# Patient Record
Sex: FEMALE | Race: WHITE | NOT HISPANIC OR LATINO | Employment: OTHER | URBAN - METROPOLITAN AREA
[De-identification: names, ages, dates, MRNs, and addresses within clinical notes are randomized per-mention and may not be internally consistent; named-entity substitution may affect disease eponyms.]

---

## 2017-03-02 DIAGNOSIS — I10 ESSENTIAL HYPERTENSION: ICD-10-CM

## 2017-03-03 RX ORDER — METOPROLOL SUCCINATE 50 MG/1
TABLET, EXTENDED RELEASE ORAL
Qty: 90 TABLET | Refills: 0 | OUTPATIENT
Start: 2017-03-03

## 2017-03-06 ENCOUNTER — OFFICE VISIT (OUTPATIENT)
Dept: FAMILY MEDICINE CLINIC | Facility: CLINIC | Age: 77
End: 2017-03-06

## 2017-03-06 VITALS
BODY MASS INDEX: 31.72 KG/M2 | OXYGEN SATURATION: 95 % | SYSTOLIC BLOOD PRESSURE: 122 MMHG | DIASTOLIC BLOOD PRESSURE: 72 MMHG | WEIGHT: 168 LBS | RESPIRATION RATE: 16 BRPM | TEMPERATURE: 98.4 F | HEIGHT: 61 IN | HEART RATE: 73 BPM

## 2017-03-06 DIAGNOSIS — I10 ESSENTIAL HYPERTENSION: ICD-10-CM

## 2017-03-06 DIAGNOSIS — E78.5 DYSLIPIDEMIA: ICD-10-CM

## 2017-03-06 DIAGNOSIS — J01.00 ACUTE NON-RECURRENT MAXILLARY SINUSITIS: ICD-10-CM

## 2017-03-06 PROCEDURE — 99214 OFFICE O/P EST MOD 30 MIN: CPT | Performed by: NURSE PRACTITIONER

## 2017-03-06 RX ORDER — TRIAMTERENE AND HYDROCHLOROTHIAZIDE 37.5; 25 MG/1; MG/1
1 TABLET ORAL DAILY
Qty: 90 TABLET | Refills: 1 | Status: SHIPPED | OUTPATIENT
Start: 2017-03-06

## 2017-03-06 RX ORDER — METOPROLOL SUCCINATE 50 MG/1
50 TABLET, EXTENDED RELEASE ORAL DAILY
Qty: 90 TABLET | Refills: 1 | Status: SHIPPED | OUTPATIENT
Start: 2017-03-06 | End: 2017-10-10 | Stop reason: SDUPTHER

## 2017-03-06 RX ORDER — ATORVASTATIN CALCIUM 40 MG/1
40 TABLET, FILM COATED ORAL NIGHTLY
Qty: 90 TABLET | Refills: 1 | Status: SHIPPED | OUTPATIENT
Start: 2017-03-06

## 2017-03-06 RX ORDER — FEXOFENADINE HCL 180 MG/1
180 TABLET ORAL DAILY
Qty: 90 TABLET | Refills: 1 | Status: SHIPPED | OUTPATIENT
Start: 2017-03-06 | End: 2017-12-19 | Stop reason: SDUPTHER

## 2017-03-06 RX ORDER — POTASSIUM CHLORIDE 750 MG/1
10 TABLET, FILM COATED, EXTENDED RELEASE ORAL DAILY
Qty: 90 TABLET | Refills: 1 | Status: SHIPPED | OUTPATIENT
Start: 2017-03-06

## 2017-03-06 NOTE — PROGRESS NOTES
Subjective   Steph Quiles is a 76 y.o. female.     History of Present Illness   Steph Quiles 76 y.o. female who presents today for routine follow up check and medication refills.  she has a history of   Patient Active Problem List   Diagnosis   • Gastroesophageal reflux disease   • Anxiety   • Allergic rhinitis   • Asymptomatic postmenopausal status   • Cervical radiculopathy   • Cervicalgia   • Cold intolerance   • Herpes labialis   • Diarrhea   • Dyslipidemia   • Dysuria   • Hirsutism   • Hypertension   • Strain of lumbar region   • Abnormal mammogram   • Sciatica   • Shoulder joint derangement   • Vitamin D deficiency   • Moderate persistent asthma without complication   • Degenerative disc disease, lumbar   • Spinal stenosis, lumbar   • Multilevel degenerative disc disease   .  Since the last visit, she has overall felt well.  she has been compliant with current meds.  she denies medication side effects. Patient sees pulmonologist Dr. Andino for asthma management.   The following portions of the patient's history were reviewed and updated as appropriate: allergies, current medications, past family history, past medical history, past social history, past surgical history and problem list.    Review of Systems   Constitutional: Negative for unexpected weight change.   Respiratory: Negative for shortness of breath.    Cardiovascular: Negative for chest pain and palpitations.   Psychiatric/Behavioral: Negative for behavioral problems.       Objective   Physical Exam   Constitutional: She is oriented to person, place, and time. She appears well-developed and well-nourished.   Neck: Carotid bruit is not present.   Cardiovascular: Normal rate and regular rhythm.    Pulmonary/Chest: Effort normal and breath sounds normal.   Neurological: She is alert and oriented to person, place, and time.   Psychiatric: She has a normal mood and affect. Judgment normal.   Vitals reviewed.      Assessment/Plan   Steph was seen today  for allergies and hypertension.    Diagnoses and all orders for this visit:    Essential hypertension  -     triamterene-hydrochlorothiazide (MAXZIDE-25) 37.5-25 MG per tablet; Take 1 tablet by mouth Daily.  -     potassium chloride (K-DUR) 10 MEQ CR tablet; Take 1 tablet by mouth Daily.  -     metoprolol succinate XL (TOPROL-XL) 50 MG 24 hr tablet; Take 1 tablet by mouth Daily.  -     Comprehensive metabolic panel  -     Lipid panel  -     CBC and Differential  -     TSH    Acute non-recurrent maxillary sinusitis  -     sertraline (ZOLOFT) 50 MG tablet; Take 1 tablet by mouth Daily.    Dyslipidemia  -     atorvastatin (LIPITOR) 40 MG tablet; Take 1 tablet by mouth Every Night.  -     Comprehensive metabolic panel  -     Lipid panel  -     CBC and Differential  -     TSH    Other orders  -     fexofenadine (ALLEGRA) 180 MG tablet; Take 1 tablet by mouth Daily.

## 2017-03-08 LAB
ALBUMIN SERPL-MCNC: 4.3 G/DL (ref 3.5–5.2)
ALBUMIN/GLOB SERPL: 1.7 G/DL
ALP SERPL-CCNC: 64 U/L (ref 39–117)
ALT SERPL-CCNC: 14 U/L (ref 1–33)
AST SERPL-CCNC: 16 U/L (ref 1–32)
BASOPHILS # BLD AUTO: 0.04 10*3/MM3 (ref 0–0.2)
BASOPHILS NFR BLD AUTO: 0.7 % (ref 0–1.5)
BILIRUB SERPL-MCNC: 0.5 MG/DL (ref 0.1–1.2)
BUN SERPL-MCNC: 16 MG/DL (ref 8–23)
BUN/CREAT SERPL: 16.7 (ref 7–25)
CALCIUM SERPL-MCNC: 10.2 MG/DL (ref 8.6–10.5)
CHLORIDE SERPL-SCNC: 103 MMOL/L (ref 98–107)
CHOLEST SERPL-MCNC: 224 MG/DL (ref 0–200)
CO2 SERPL-SCNC: 27.3 MMOL/L (ref 22–29)
CREAT SERPL-MCNC: 0.96 MG/DL (ref 0.57–1)
EOSINOPHIL # BLD AUTO: 0.38 10*3/MM3 (ref 0–0.7)
EOSINOPHIL NFR BLD AUTO: 6.7 % (ref 0.3–6.2)
ERYTHROCYTE [DISTWIDTH] IN BLOOD BY AUTOMATED COUNT: 13.7 % (ref 11.7–13)
GLOBULIN SER CALC-MCNC: 2.6 GM/DL
GLUCOSE SERPL-MCNC: 107 MG/DL (ref 65–99)
HCT VFR BLD AUTO: 44.2 % (ref 35.6–45.5)
HDLC SERPL-MCNC: 95 MG/DL (ref 40–60)
HGB BLD-MCNC: 13.9 G/DL (ref 11.9–15.5)
IMM GRANULOCYTES # BLD: 0 10*3/MM3 (ref 0–0.03)
IMM GRANULOCYTES NFR BLD: 0 % (ref 0–0.5)
LDLC SERPL CALC-MCNC: 112 MG/DL (ref 0–100)
LYMPHOCYTES # BLD AUTO: 2.05 10*3/MM3 (ref 0.9–4.8)
LYMPHOCYTES NFR BLD AUTO: 36.3 % (ref 19.6–45.3)
MCH RBC QN AUTO: 28.4 PG (ref 26.9–32)
MCHC RBC AUTO-ENTMCNC: 31.4 G/DL (ref 32.4–36.3)
MCV RBC AUTO: 90.2 FL (ref 80.5–98.2)
MONOCYTES # BLD AUTO: 0.4 10*3/MM3 (ref 0.2–1.2)
MONOCYTES NFR BLD AUTO: 7.1 % (ref 5–12)
NEUTROPHILS # BLD AUTO: 2.78 10*3/MM3 (ref 1.9–8.1)
NEUTROPHILS NFR BLD AUTO: 49.2 % (ref 42.7–76)
PLATELET # BLD AUTO: 200 10*3/MM3 (ref 140–500)
POTASSIUM SERPL-SCNC: 5.7 MMOL/L (ref 3.5–5.2)
PROT SERPL-MCNC: 6.9 G/DL (ref 6–8.5)
RBC # BLD AUTO: 4.9 10*6/MM3 (ref 3.9–5.2)
SODIUM SERPL-SCNC: 145 MMOL/L (ref 136–145)
TRIGL SERPL-MCNC: 85 MG/DL (ref 0–150)
TSH SERPL DL<=0.005 MIU/L-ACNC: 2.97 MIU/ML (ref 0.27–4.2)
VLDLC SERPL CALC-MCNC: 17 MG/DL (ref 5–40)
WBC # BLD AUTO: 5.65 10*3/MM3 (ref 4.5–10.7)

## 2017-03-09 ENCOUNTER — TELEPHONE (OUTPATIENT)
Dept: FAMILY MEDICINE CLINIC | Facility: CLINIC | Age: 77
End: 2017-03-09

## 2017-03-09 DIAGNOSIS — E87.5 SERUM POTASSIUM ELEVATED: Primary | ICD-10-CM

## 2017-03-09 NOTE — TELEPHONE ENCOUNTER
----- Message from GUNNAR Dash sent at 3/8/2017  9:45 PM EST -----  Potassium is elevated.  This can cause issues with heart rhythm.  Have her stop oral potassium and repeat nonfasting BMP in 1 week

## 2017-03-18 LAB
BUN SERPL-MCNC: 14 MG/DL (ref 8–23)
BUN/CREAT SERPL: 16.9 (ref 7–25)
CALCIUM SERPL-MCNC: 9.6 MG/DL (ref 8.6–10.5)
CHLORIDE SERPL-SCNC: 103 MMOL/L (ref 98–107)
CO2 SERPL-SCNC: 28.9 MMOL/L (ref 22–29)
CREAT SERPL-MCNC: 0.83 MG/DL (ref 0.57–1)
GLUCOSE SERPL-MCNC: 111 MG/DL (ref 65–99)
POTASSIUM SERPL-SCNC: 4.5 MMOL/L (ref 3.5–5.2)
SODIUM SERPL-SCNC: 145 MMOL/L (ref 136–145)

## 2017-03-22 ENCOUNTER — TELEPHONE (OUTPATIENT)
Dept: FAMILY MEDICINE CLINIC | Facility: CLINIC | Age: 77
End: 2017-03-22

## 2017-10-10 DIAGNOSIS — I10 ESSENTIAL HYPERTENSION: ICD-10-CM

## 2017-10-10 RX ORDER — METOPROLOL SUCCINATE 50 MG/1
50 TABLET, EXTENDED RELEASE ORAL DAILY
Qty: 30 TABLET | Refills: 0 | Status: SHIPPED | OUTPATIENT
Start: 2017-10-10 | End: 2018-07-09 | Stop reason: SDUPTHER

## 2017-10-18 ENCOUNTER — APPOINTMENT (OUTPATIENT)
Dept: GENERAL RADIOLOGY | Facility: HOSPITAL | Age: 77
End: 2017-10-18

## 2017-10-18 ENCOUNTER — HOSPITAL ENCOUNTER (EMERGENCY)
Facility: HOSPITAL | Age: 77
Discharge: HOME | End: 2017-10-18
Attending: EMERGENCY MEDICINE | Admitting: EMERGENCY MEDICINE

## 2017-10-18 ENCOUNTER — DOCUMENTATION (OUTPATIENT)
Dept: SOCIAL WORK | Facility: HOSPITAL | Age: 77
End: 2017-10-18

## 2017-10-18 VITALS
RESPIRATION RATE: 16 BRPM | HEIGHT: 61 IN | OXYGEN SATURATION: 96 % | SYSTOLIC BLOOD PRESSURE: 120 MMHG | BODY MASS INDEX: 30.02 KG/M2 | WEIGHT: 159 LBS | HEART RATE: 71 BPM | TEMPERATURE: 98 F | DIASTOLIC BLOOD PRESSURE: 66 MMHG

## 2017-10-18 DIAGNOSIS — S83.92XA SPRAIN OF LEFT KNEE, UNSPECIFIED LIGAMENT, INITIAL ENCOUNTER: Primary | ICD-10-CM

## 2017-10-18 PROCEDURE — 73560 X-RAY EXAM OF KNEE 1 OR 2: CPT

## 2017-10-18 PROCEDURE — 25010000002 ONDANSETRON PER 1 MG: Performed by: EMERGENCY MEDICINE

## 2017-10-18 PROCEDURE — 99283 EMERGENCY DEPT VISIT LOW MDM: CPT

## 2017-10-18 PROCEDURE — 96372 THER/PROPH/DIAG INJ SC/IM: CPT

## 2017-10-18 PROCEDURE — 25010000002 MORPHINE PER 10 MG: Performed by: EMERGENCY MEDICINE

## 2017-10-18 RX ORDER — HYDROCODONE BITARTRATE AND ACETAMINOPHEN 5; 325 MG/1; MG/1
1 TABLET ORAL EVERY 6 HOURS PRN
Qty: 10 TABLET | Refills: 0 | Status: SHIPPED | OUTPATIENT
Start: 2017-10-18

## 2017-10-18 RX ORDER — ONDANSETRON 2 MG/ML
4 INJECTION INTRAMUSCULAR; INTRAVENOUS ONCE
Status: COMPLETED | OUTPATIENT
Start: 2017-10-18 | End: 2017-10-18

## 2017-10-18 RX ORDER — DOCUSATE SODIUM 100 MG/1
100 CAPSULE, LIQUID FILLED ORAL 2 TIMES DAILY PRN
Qty: 10 CAPSULE | Refills: 0 | Status: SHIPPED | OUTPATIENT
Start: 2017-10-18

## 2017-10-18 RX ADMIN — ONDANSETRON 4 MG: 2 INJECTION INTRAMUSCULAR; INTRAVENOUS at 13:43

## 2017-10-18 RX ADMIN — MORPHINE SULFATE 4 MG: 4 INJECTION, SOLUTION INTRAMUSCULAR; INTRAVENOUS at 13:43

## 2017-10-18 NOTE — PROGRESS NOTES
CCP spoke w/patient regarding request for knee scooter due to knee injury. Spoke w/mar who advised cost is $75/month rental fee; No RX needed. Patient and ERMD advised. Soledad Owen RN

## 2017-10-18 NOTE — ED PROVIDER NOTES
" EMERGENCY DEPARTMENT ENCOUNTER    CHIEF COMPLAINT  Chief Complaint: Knee Pain  History given by: Patient, Family  History limited by: N/A  Room Number: 23/23  PMD: GUNNAR Dash      HPI:  Pt reports that she is s/p left leg surgery performed in the past. Pt states that she was on her feet for a prolonged period of time yesterday while doing yard work and her daily house chores. Pt reports that while she was mopping the floor last night, pt developed pain of the left knee. No known injuries sustained to the left knee recently. Pt states that her left knee pain worsens with movement of the left knee and improves with resting the left knee. Pt denies documented fever, dyspnea, chest pain, left calf pain/tenderness, and focal weakness/numbness of the LLE. There are no other complaints at this time.     Pain Location: Left knee  Radiation: None  Quality: \"aching\"  Intensity/Severity: Moderate  Duration: Onset last night  Onset quality: Gradual  Timing: Intermittent  Progression: Waxing and waning  Aggravating Factors: Movement of the left knee  Alleviating Factors: Resting the left knee  Previous Episodes: None  Treatment before arrival: None  Associated Symptoms: None      PAST MEDICAL HISTORY  Active Ambulatory Problems     Diagnosis Date Noted   • Gastroesophageal reflux disease 04/22/2016   • Anxiety 04/22/2016   • Allergic rhinitis 04/22/2016   • Asymptomatic postmenopausal status 04/22/2016   • Cervical radiculopathy 04/22/2016   • Cervicalgia 04/22/2016   • Cold intolerance 04/22/2016   • Herpes labialis 04/22/2016   • Diarrhea 04/22/2016   • Dyslipidemia 04/22/2016   • Dysuria 04/22/2016   • Hirsutism 04/22/2016   • Hypertension 04/22/2016   • Strain of lumbar region 04/22/2016   • Abnormal mammogram 04/22/2016   • Sciatica 04/22/2016   • Shoulder joint derangement 04/22/2016   • Vitamin D deficiency 04/22/2016   • Moderate persistent asthma without complication 04/22/2016   • Degenerative disc " disease, lumbar 04/22/2016   • Spinal stenosis, lumbar 04/22/2016   • Multilevel degenerative disc disease 05/13/2016     Resolved Ambulatory Problems     Diagnosis Date Noted   • Asthmatic bronchitis 04/22/2016   • Incontinence of feces 04/22/2016   • Community acquired pneumonia 04/22/2016   • Subungual hematoma of digit of hand 04/22/2016   • Trigger finger of right hand 04/22/2016   • Trigger finger of left hand 04/22/2016     Past Medical History:   Diagnosis Date   • Allergic rhinitis    • Arthritis    • Asthma    • Generalized headaches    • Hyperlipidemia    • Hypertension    • Osteoporosis          PAST SURGICAL HISTORY  Past Surgical History:   Procedure Laterality Date   • CHOLECYSTECTOMY     • FRACTURE SURGERY Left     Left ankle/lower leg   • GANGLION CYST EXCISION     • LEG SURGERY     • WRIST SURGERY           FAMILY HISTORY  Family History   Problem Relation Age of Onset   • Lung disease Father    • Rheum arthritis Sister    • Heart disease Sister    • Hypertension Brother    • Colon cancer Brother          SOCIAL HISTORY  Social History     Social History   • Marital status:      Spouse name: N/A   • Number of children: N/A   • Years of education: N/A     Occupational History   • Not on file.     Social History Main Topics   • Smoking status: Never Smoker   • Smokeless tobacco: Never Used   • Alcohol use No   • Drug use: No   • Sexual activity: Not on file     Other Topics Concern   • Not on file     Social History Narrative         ALLERGIES  Macrobid [nitrofurantoin monohyd macro]        REVIEW OF SYSTEMS  Review of Systems   Constitutional: Negative.  Negative for fever (pt denies documented fever).   Eyes: Negative for visual disturbance.   Respiratory: Negative for shortness of breath.    Cardiovascular: Negative for chest pain.   Gastrointestinal: Negative for abdominal pain, nausea and vomiting.   Musculoskeletal: Negative for neck pain.        Left knee pain   Skin: Negative.     Neurological: Negative for syncope, weakness, numbness and headaches.   Psychiatric/Behavioral: Negative.    All other systems reviewed and are negative.           PHYSICAL EXAM  ED Triage Vitals   Temp Heart Rate Resp BP SpO2   10/18/17 1256 10/18/17 1256 10/18/17 1256 10/18/17 1306 10/18/17 1256   97.6 °F (36.4 °C) 85 16 134/75 97 % WNL      Temp src Heart Rate Source Patient Position BP Location FiO2 (%)   10/18/17 1256 10/18/17 1256 -- -- --   Tympanic Monitor          Physical Exam   Constitutional: She is oriented to person, place, and time. No distress.   Eyes: Pupils are equal, round, and reactive to light.   Neck: Neck supple.   Cardiovascular: Normal rate and regular rhythm.    Pulses:       Dorsalis pedis pulses are 2+ on the right side, and 2+ on the left side.        Posterior tibial pulses are 2+ on the right side, and 2+ on the left side.   Pulmonary/Chest: Effort normal and breath sounds normal. No respiratory distress.   Abdominal: Soft.   Musculoskeletal: She exhibits tenderness ( Tenderness to the left medial knee.  ).   Swelling to the left medial knee. Left calf is nontender.    Neurological: She is alert and oriented to person, place, and time. She has normal sensation and normal strength.   Skin: Skin is warm and dry.   Psychiatric: Affect normal.   Nursing note and vitals reviewed.            RADIOLOGY  XR Knee 1 or 2 View Left   Preliminary Result   There are subtle radiographic changes of osteoarthritis at   the medial and patellofemoral compartments of the left knee. No acute or   subacute appearing abnormality is present.       Interpreted by radiologist. Independently viewed by me.             Ordered the above noted radiological studies. Reviewed by me in PACS.       PROCEDURES  Procedures            PROGRESS AND CONSULTS  ED Course   Comment By Time   3:10 PM  Patient was cleaning and had injury to left knee.  Pain and swelling to medial left knee.  Xrays negative.  Will give small  amount of pain meds.  Knee immobilizer.  Will refer to ortho.  Patient is going to [place] to rent a knee scooter. Tramaine Wasserman MD 10/18 1511     1:21 PM:  Left knee X-Ray ordered for further evaluation. Morphine and zofran ordered to treat for knee pain.     2:43 PM:  Rechecked pt. Pt is resting comfortably and appears in no acute distress. Informed pt that her left knee X-Ray shows that arthritic changes are present in the knee. There are no acute fractures present. Pt would like a scooter for mobility - pt will go to [employer] for this. Pt will be prescribed with rx for small amount of pain medicine for discomfort and stool softener. Pt will also be provided with f/u referral to the orthopedist. RTER warnings given. Pt agrees with plan for discharge.                 MEDICAL DECISION MAKING      MDM  Number of Diagnoses or Management Options  Sprain of left knee, unspecified ligament, initial encounter:      Amount and/or Complexity of Data Reviewed  Tests in the radiology section of CPT®: ordered and reviewed (Left knee X-Ray: There is no joint effusion. Small marginal osteophytes are observed around the medial and patellofemoral compartments indicative of some degenerative change. However, the radiographic joint spaces appear normal. No fracture, bone lesion, osteonecrosis, or osteochondral defect is evident. Soft tissue contours appear normal.)    Patient Progress  Patient progress: stable             DIAGNOSIS  Final diagnoses:   Sprain of left knee, unspecified ligament, initial encounter         DISPOSITION  Pt discharged.    DISCHARGE    Patient discharged in stable condition.    Reviewed implications of results, diagnosis, meds, responsibility to follow up, warning signs and symptoms of possible worsening, potential complications and reasons to return to ER.    Patient/Family voiced understanding of above instructions.    Discussed plan for discharge, as there is no emergent indication for admission.   Pt/family is agreeable and understands need for follow up and repeat testing.  Pt is aware that discharge does not mean that nothing is wrong but it indicates no emergency is present that requires admission and they must continue care with follow-up as given below or physician of their choice.     FOLLOW-UP  Ruchi Shaorndanielle Marie, APRN  [address] [facility]  [address]  [facility] [id number] [phone]    Schedule an appointment as soon as possible for a visit      Kiko Means MD  [address]  [address]  [facility] [id number] [phone]    Schedule an appointment as soon as possible for a visit           Medication List      New Prescriptions          docusate sodium 100 MG capsule   Commonly known as:  COLACE   Take 1 capsule by mouth 2 (Two) Times a Day As Needed for Constipation.       HYDROcodone-acetaminophen 5-325 MG per tablet   Commonly known as:  NORCO   Take 1 tablet by mouth Every 6 (Six) Hours As Needed for Moderate Pain .         Stop          fluticasone-salmeterol 250-50 MCG/DOSE DISKUS   Commonly known as:  ADVAIR                   Latest Documented Vital Signs:  As of 4:00 PM  BP- 120/66 HR- 71 Temp- 98 °F (36.7 °C) (Tympanic) O2 sat- 96%      --  Documentation assistance provided by steph Guillen for Dr. Lisy MD.  Information recorded by the scribe was done at my direction and has been verified and validated by me.           Mckenzie Guillen  10/18/17 1628       Tramaine Wasserman MD  10/18/17 1620

## 2017-12-20 RX ORDER — FEXOFENADINE HCL 180 MG/1
TABLET ORAL
Qty: 90 TABLET | Refills: 0 | Status: SHIPPED | OUTPATIENT
Start: 2017-12-20

## 2018-02-06 DIAGNOSIS — I10 ESSENTIAL HYPERTENSION: ICD-10-CM

## 2018-02-06 DIAGNOSIS — J01.00 ACUTE NON-RECURRENT MAXILLARY SINUSITIS: ICD-10-CM

## 2018-02-06 RX ORDER — METOPROLOL SUCCINATE 50 MG/1
TABLET, EXTENDED RELEASE ORAL
Qty: 30 TABLET | Refills: 0 | OUTPATIENT
Start: 2018-02-06

## 2018-02-07 DIAGNOSIS — J01.00 ACUTE NON-RECURRENT MAXILLARY SINUSITIS: ICD-10-CM

## 2018-02-07 DIAGNOSIS — I10 ESSENTIAL HYPERTENSION: ICD-10-CM

## 2018-02-08 DIAGNOSIS — I10 ESSENTIAL HYPERTENSION: ICD-10-CM

## 2018-02-08 DIAGNOSIS — J01.00 ACUTE NON-RECURRENT MAXILLARY SINUSITIS: ICD-10-CM

## 2018-02-08 RX ORDER — METOPROLOL SUCCINATE 50 MG/1
TABLET, EXTENDED RELEASE ORAL
Qty: 30 TABLET | Refills: 0 | OUTPATIENT
Start: 2018-02-08

## 2018-04-13 DIAGNOSIS — E78.5 DYSLIPIDEMIA: ICD-10-CM

## 2018-04-13 RX ORDER — ATORVASTATIN CALCIUM 40 MG/1
TABLET, FILM COATED ORAL
Qty: 90 TABLET | Refills: 0 | OUTPATIENT
Start: 2018-04-13

## 2018-07-09 DIAGNOSIS — I10 ESSENTIAL HYPERTENSION: ICD-10-CM

## 2018-07-09 RX ORDER — METOPROLOL SUCCINATE 50 MG/1
TABLET, EXTENDED RELEASE ORAL
Qty: 30 TABLET | Refills: 0 | Status: SHIPPED | OUTPATIENT
Start: 2018-07-09

## 2018-08-07 DIAGNOSIS — I10 ESSENTIAL HYPERTENSION: ICD-10-CM

## 2018-08-07 RX ORDER — METOPROLOL SUCCINATE 50 MG/1
TABLET, EXTENDED RELEASE ORAL
Qty: 6 TABLET | Refills: 0 | OUTPATIENT
Start: 2018-08-07

## 2018-08-23 DIAGNOSIS — I10 ESSENTIAL HYPERTENSION: ICD-10-CM

## 2018-08-24 RX ORDER — METOPROLOL SUCCINATE 50 MG/1
TABLET, EXTENDED RELEASE ORAL
Qty: 6 TABLET | Refills: 0 | OUTPATIENT
Start: 2018-08-24

## 2021-01-31 ENCOUNTER — IMMUNIZATION (OUTPATIENT)
Dept: VACCINE CLINIC | Facility: HOSPITAL | Age: 81
End: 2021-01-31

## 2021-01-31 PROCEDURE — 91300 HC SARSCOV02 VAC 30MCG/0.3ML IM: CPT | Performed by: INTERNAL MEDICINE

## 2021-01-31 PROCEDURE — 0001A: CPT | Performed by: INTERNAL MEDICINE

## 2021-02-21 ENCOUNTER — IMMUNIZATION (OUTPATIENT)
Dept: VACCINE CLINIC | Facility: HOSPITAL | Age: 81
End: 2021-02-21

## 2021-02-21 PROCEDURE — 0002A: CPT | Performed by: INTERNAL MEDICINE

## 2021-02-21 PROCEDURE — 91300 HC SARSCOV02 VAC 30MCG/0.3ML IM: CPT | Performed by: INTERNAL MEDICINE

## 2025-02-28 ENCOUNTER — HOSPITAL ENCOUNTER (EMERGENCY)
Facility: HOSPITAL | Age: 85
Discharge: HOME | End: 2025-02-28
Attending: STUDENT IN AN ORGANIZED HEALTH CARE EDUCATION/TRAINING PROGRAM
Payer: MEDICARE

## 2025-02-28 ENCOUNTER — APPOINTMENT (OUTPATIENT)
Dept: CT IMAGING | Facility: HOSPITAL | Age: 85
End: 2025-02-28
Payer: MEDICARE

## 2025-02-28 ENCOUNTER — APPOINTMENT (OUTPATIENT)
Dept: GENERAL RADIOLOGY | Facility: HOSPITAL | Age: 85
End: 2025-02-28
Payer: MEDICARE

## 2025-02-28 VITALS
OXYGEN SATURATION: 96 % | SYSTOLIC BLOOD PRESSURE: 130 MMHG | BODY MASS INDEX: 26.43 KG/M2 | RESPIRATION RATE: 16 BRPM | TEMPERATURE: 98.1 F | HEIGHT: 61 IN | DIASTOLIC BLOOD PRESSURE: 64 MMHG | HEART RATE: 67 BPM | WEIGHT: 140 LBS

## 2025-02-28 DIAGNOSIS — N17.9 AKI (ACUTE KIDNEY INJURY): ICD-10-CM

## 2025-02-28 DIAGNOSIS — M54.9 ACUTE ON CHRONIC BACK PAIN: ICD-10-CM

## 2025-02-28 DIAGNOSIS — S22.030A CLOSED WEDGE COMPRESSION FRACTURE OF T3 VERTEBRA, INITIAL ENCOUNTER: ICD-10-CM

## 2025-02-28 DIAGNOSIS — G89.29 ACUTE ON CHRONIC BACK PAIN: ICD-10-CM

## 2025-02-28 DIAGNOSIS — N39.0 ACUTE UTI: ICD-10-CM

## 2025-02-28 DIAGNOSIS — K92.2 GASTROINTESTINAL HEMORRHAGE, UNSPECIFIED GASTROINTESTINAL HEMORRHAGE TYPE: Primary | ICD-10-CM

## 2025-02-28 LAB
ALBUMIN SERPL-MCNC: 4.1 G/DL (ref 3.5–5.2)
ALBUMIN/GLOB SERPL: 1.5 G/DL
ALP SERPL-CCNC: 213 U/L (ref 39–117)
ALT SERPL W P-5'-P-CCNC: 15 U/L (ref 1–33)
ANION GAP SERPL CALCULATED.3IONS-SCNC: 9.1 MMOL/L (ref 5–15)
AST SERPL-CCNC: 18 U/L (ref 1–32)
BACTERIA UR QL AUTO: ABNORMAL /HPF
BASOPHILS # BLD AUTO: 0.02 10*3/MM3 (ref 0–0.2)
BASOPHILS NFR BLD AUTO: 0.3 % (ref 0–1.5)
BILIRUB SERPL-MCNC: 0.3 MG/DL (ref 0–1.2)
BILIRUB UR QL STRIP: NEGATIVE
BUN SERPL-MCNC: 47 MG/DL (ref 8–23)
BUN/CREAT SERPL: 29.2 (ref 7–25)
CALCIUM SPEC-SCNC: 9.4 MG/DL (ref 8.6–10.5)
CHLORIDE SERPL-SCNC: 108 MMOL/L (ref 98–107)
CLARITY UR: ABNORMAL
CO2 SERPL-SCNC: 21.9 MMOL/L (ref 22–29)
COLOR UR: YELLOW
CREAT SERPL-MCNC: 1.61 MG/DL (ref 0.57–1)
DEPRECATED RDW RBC AUTO: 42.8 FL (ref 37–54)
EGFRCR SERPLBLD CKD-EPI 2021: 31.2 ML/MIN/1.73
EOSINOPHIL # BLD AUTO: 0.29 10*3/MM3 (ref 0–0.4)
EOSINOPHIL NFR BLD AUTO: 4.8 % (ref 0.3–6.2)
ERYTHROCYTE [DISTWIDTH] IN BLOOD BY AUTOMATED COUNT: 12.9 % (ref 12.3–15.4)
GLOBULIN UR ELPH-MCNC: 2.8 GM/DL
GLUCOSE SERPL-MCNC: 120 MG/DL (ref 65–99)
GLUCOSE UR STRIP-MCNC: NEGATIVE MG/DL
HCT VFR BLD AUTO: 41 % (ref 34–46.6)
HGB BLD-MCNC: 13.2 G/DL (ref 12–15.9)
HGB UR QL STRIP.AUTO: ABNORMAL
HOLD SPECIMEN: NORMAL
HYALINE CASTS UR QL AUTO: ABNORMAL /LPF
IMM GRANULOCYTES # BLD AUTO: 0.01 10*3/MM3 (ref 0–0.05)
IMM GRANULOCYTES NFR BLD AUTO: 0.2 % (ref 0–0.5)
KETONES UR QL STRIP: NEGATIVE
LEUKOCYTE ESTERASE UR QL STRIP.AUTO: ABNORMAL
LIPASE SERPL-CCNC: 68 U/L (ref 13–60)
LYMPHOCYTES # BLD AUTO: 1.51 10*3/MM3 (ref 0.7–3.1)
LYMPHOCYTES NFR BLD AUTO: 24.9 % (ref 19.6–45.3)
MAGNESIUM SERPL-MCNC: 2 MG/DL (ref 1.6–2.4)
MCH RBC QN AUTO: 28.4 PG (ref 26.6–33)
MCHC RBC AUTO-ENTMCNC: 32.2 G/DL (ref 31.5–35.7)
MCV RBC AUTO: 88.4 FL (ref 79–97)
MONOCYTES # BLD AUTO: 0.38 10*3/MM3 (ref 0.1–0.9)
MONOCYTES NFR BLD AUTO: 6.3 % (ref 5–12)
NEUTROPHILS NFR BLD AUTO: 3.85 10*3/MM3 (ref 1.7–7)
NEUTROPHILS NFR BLD AUTO: 63.5 % (ref 42.7–76)
NITRITE UR QL STRIP: POSITIVE
PH UR STRIP.AUTO: 5.5 [PH] (ref 5–8)
PLATELET # BLD AUTO: 200 10*3/MM3 (ref 140–450)
PMV BLD AUTO: 9.4 FL (ref 6–12)
POTASSIUM SERPL-SCNC: 3.5 MMOL/L (ref 3.5–5.2)
PROT SERPL-MCNC: 6.9 G/DL (ref 6–8.5)
PROT UR QL STRIP: ABNORMAL
RBC # BLD AUTO: 4.64 10*6/MM3 (ref 3.77–5.28)
RBC # UR STRIP: ABNORMAL /HPF
REF LAB TEST METHOD: ABNORMAL
SODIUM SERPL-SCNC: 139 MMOL/L (ref 136–145)
SP GR UR STRIP: 1.01 (ref 1–1.03)
SQUAMOUS #/AREA URNS HPF: ABNORMAL /HPF
UROBILINOGEN UR QL STRIP: ABNORMAL
WBC # UR STRIP: ABNORMAL /HPF
WBC NRBC COR # BLD AUTO: 6.06 10*3/MM3 (ref 3.4–10.8)

## 2025-02-28 PROCEDURE — 72128 CT CHEST SPINE W/O DYE: CPT

## 2025-02-28 PROCEDURE — 87086 URINE CULTURE/COLONY COUNT: CPT | Performed by: NURSE PRACTITIONER

## 2025-02-28 PROCEDURE — 87088 URINE BACTERIA CULTURE: CPT | Performed by: NURSE PRACTITIONER

## 2025-02-28 PROCEDURE — 83690 ASSAY OF LIPASE: CPT | Performed by: NURSE PRACTITIONER

## 2025-02-28 PROCEDURE — 96365 THER/PROPH/DIAG IV INF INIT: CPT

## 2025-02-28 PROCEDURE — 72131 CT LUMBAR SPINE W/O DYE: CPT

## 2025-02-28 PROCEDURE — 80053 COMPREHEN METABOLIC PANEL: CPT | Performed by: NURSE PRACTITIONER

## 2025-02-28 PROCEDURE — 83735 ASSAY OF MAGNESIUM: CPT | Performed by: NURSE PRACTITIONER

## 2025-02-28 PROCEDURE — 87186 SC STD MICRODIL/AGAR DIL: CPT | Performed by: NURSE PRACTITIONER

## 2025-02-28 PROCEDURE — 99284 EMERGENCY DEPT VISIT MOD MDM: CPT | Performed by: NURSE PRACTITIONER

## 2025-02-28 PROCEDURE — 25010000002 CEFTRIAXONE PER 250 MG: Performed by: NURSE PRACTITIONER

## 2025-02-28 PROCEDURE — 25010000002 ONDANSETRON PER 1 MG: Performed by: NURSE PRACTITIONER

## 2025-02-28 PROCEDURE — 96375 TX/PRO/DX INJ NEW DRUG ADDON: CPT

## 2025-02-28 PROCEDURE — 85025 COMPLETE CBC W/AUTO DIFF WBC: CPT | Performed by: NURSE PRACTITIONER

## 2025-02-28 PROCEDURE — 96367 TX/PROPH/DG ADDL SEQ IV INF: CPT

## 2025-02-28 PROCEDURE — 74176 CT ABD & PELVIS W/O CONTRAST: CPT

## 2025-02-28 PROCEDURE — 25810000003 SODIUM CHLORIDE 0.9 % SOLUTION: Performed by: NURSE PRACTITIONER

## 2025-02-28 PROCEDURE — 99284 EMERGENCY DEPT VISIT MOD MDM: CPT

## 2025-02-28 PROCEDURE — 81001 URINALYSIS AUTO W/SCOPE: CPT | Performed by: NURSE PRACTITIONER

## 2025-02-28 RX ORDER — CEFDINIR 300 MG/1
300 CAPSULE ORAL DAILY
Qty: 7 CAPSULE | Refills: 0 | Status: SHIPPED | OUTPATIENT
Start: 2025-02-28 | End: 2025-03-07

## 2025-02-28 RX ORDER — ONDANSETRON 4 MG/1
4 TABLET, ORALLY DISINTEGRATING ORAL EVERY 8 HOURS PRN
Qty: 10 TABLET | Refills: 0 | Status: SHIPPED | OUTPATIENT
Start: 2025-02-28

## 2025-02-28 RX ORDER — ONDANSETRON 2 MG/ML
4 INJECTION INTRAMUSCULAR; INTRAVENOUS ONCE
Status: COMPLETED | OUTPATIENT
Start: 2025-02-28 | End: 2025-02-28

## 2025-02-28 RX ORDER — OMEPRAZOLE 40 MG/1
40 CAPSULE, DELAYED RELEASE ORAL DAILY
Qty: 30 CAPSULE | Refills: 0 | Status: SHIPPED | OUTPATIENT
Start: 2025-02-28

## 2025-02-28 RX ORDER — SODIUM CHLORIDE 0.9 % (FLUSH) 0.9 %
10 SYRINGE (ML) INJECTION AS NEEDED
Status: DISCONTINUED | OUTPATIENT
Start: 2025-02-28 | End: 2025-02-28 | Stop reason: HOSPADM

## 2025-02-28 RX ORDER — HYDROCODONE BITARTRATE AND ACETAMINOPHEN 5; 325 MG/1; MG/1
1 TABLET ORAL EVERY 6 HOURS PRN
Qty: 10 TABLET | Refills: 0 | Status: SHIPPED | OUTPATIENT
Start: 2025-02-28

## 2025-02-28 RX ADMIN — CEFTRIAXONE 1000 MG: 1 INJECTION, POWDER, FOR SOLUTION INTRAMUSCULAR; INTRAVENOUS at 16:01

## 2025-02-28 RX ADMIN — SODIUM CHLORIDE 8 MG/HR: 900 INJECTION INTRAVENOUS at 14:59

## 2025-02-28 RX ADMIN — ONDANSETRON 4 MG: 2 INJECTION INTRAMUSCULAR; INTRAVENOUS at 15:00

## 2025-02-28 RX ADMIN — SODIUM CHLORIDE 500 ML: 9 INJECTION, SOLUTION INTRAVENOUS at 15:00

## 2025-02-28 NOTE — DISCHARGE INSTRUCTIONS
Need to add water into your diet.  Coffee and soda is not good    2.  Use zofran for nausea, use intermittently as it can cause constipation    3.  Start the Protonix for the GI bleeding.  I believe this is caused from the Diclofenac.  I would avoid all NSAIDS (motrin, ibuprofen, Aspirin, aleve, naproxen).      4.  For the back pain please take the hydrocodone or just plan tylenol.  You might get the best bang for your matthews using Tylenol arthritis formula.    5.  Small frequent meals throughout the day.    Please follow up with your Primary Care next week for the following:  A.  Recheck of labs to include CBC and renal function  B.  Discuss longer term pain options for the back pain.    I also am entering a referral for Gastroenterology for follow up.    Return Precautions    Although you are being discharged from the ED today, I encourage you to return for worsening symptoms.  Things can, and do, change such that treatment at home with medication may not be adequate.      Specifically, return for any of the following: Severe nausea vomiting, passing out, lightheadedness, inability to take anything orally by mouth such as food or fluids, worsening of abdominal pain or any other concerns    Chest pain, shortness of breath, pain or nausea and vomiting not controlled by medications provided.    Please make a follow up with your Primary Care Provider for a blood pressure recheck.

## 2025-02-28 NOTE — [facility] PROVIDER NOTE
EMERGENCY DEPARTMENT ENCOUNTER    Room Number:  06/06  Date seen:  2/28/2025  Time seen: 13:08 EST  PCP: Ruchi Moore APRN ([place])  Historian: patient    Discussed/obtained information from independent historians: n/a    HPI:  Chief complaint:n/v/d, back pain  A complete HPI/ROS/PMH/PSH/SH/FH are unobtainable due to:   Context:Steph Quiles is a 84 y.o. female with history of GERD, allergic rhinitis, dyslipidemia, diabetes, low back pain and asthma who presents to the ED with c/o one week of nausea, vomiting and diarrhea.  She states her emesis and stool are very dark and this started after starting Diclofenac for her back pain.  She states this symptoms will start with a very large burp where she has some emesis in her mouth.  She also reports worsening of her mid to lower back pain and having hip pain after a mechanical fall approx one month ago.  Daughter states she hasn't eaten too much and has been drink quite a bit of soda and coffee. She denies urinary symptoms, loss of bowel/bladder control, numbness, tingling or recent falls. She is not anticoagulated and has h/o cholecystectomy.     External (non-ED) record review: Patient was seen in the ED after a fall and had CT cervical spine CT head completed which were negative for any acute findings.    Chronic or social conditions impacting care:    ALLERGIES  Macrobid [nitrofurantoin monohyd macro] and Nitrofurantoin    PAST MEDICAL HISTORY  Active Ambulatory Problems     Diagnosis Date Noted    Gastroesophageal reflux disease 04/22/2016    Anxiety 04/22/2016    Allergic rhinitis 04/22/2016    Asymptomatic postmenopausal status 04/22/2016    Cervical radiculopathy 04/22/2016    Cervicalgia 04/22/2016    Cold intolerance 04/22/2016    Herpes labialis 04/22/2016    Diarrhea 04/22/2016    Dyslipidemia 04/22/2016    Dysuria 04/22/2016    Hirsutism 04/22/2016    Hypertension 04/22/2016    Strain of lumbar region 04/22/2016    Abnormal mammogram 04/22/2016     Sciatica 04/22/2016    Shoulder joint derangement 04/22/2016    Vitamin D deficiency 04/22/2016    Moderate persistent asthma without complication 04/22/2016    Degenerative disc disease, lumbar 04/22/2016    Spinal stenosis, lumbar 04/22/2016    Multilevel degenerative disc disease 05/13/2016     Resolved Ambulatory Problems     Diagnosis Date Noted    Asthmatic bronchitis 04/22/2016    Incontinence of feces 04/22/2016    Community acquired pneumonia 04/22/2016    Subungual hematoma of digit of hand 04/22/2016    Trigger finger of right hand 04/22/2016    Trigger finger of left hand 04/22/2016     Past Medical History:   Diagnosis Date    Arthritis     Asthma     Generalized headaches     Hyperlipidemia     Osteoporosis        PAST SURGICAL HISTORY  Past Surgical History:   Procedure Laterality Date    CHOLECYSTECTOMY      FRACTURE SURGERY Left     Left ankle/lower leg    GANGLION CYST EXCISION      LEG SURGERY      WRIST SURGERY         FAMILY HISTORY  Family History   Problem Relation Age of Onset    Lung disease Father     Rheum arthritis Sister     Heart disease Sister     Hypertension Brother     Colon cancer Brother        SOCIAL HISTORY  Social History     Socioeconomic History    Marital status:    Tobacco Use    Smoking status: Never    Smokeless tobacco: Never   Substance and Sexual Activity    Alcohol use: No    Drug use: No       REVIEW OF SYSTEMS  Review of Systems    All systems reviewed and negative except for those discussed in HPI.     PHYSICAL EXAM    I have reviewed the triage vital signs and nursing notes.  Vitals:    02/28/25 1446   BP: 130/64   Pulse: 67   Resp: 16   Temp:    SpO2: 96%     Physical Exam  Exam conducted with a chaperone present.   Genitourinary:     Rectum: Guaiac result negative. No tenderness.      Comments: The skin around rectum is excoriated and reddened.  She has a non- thrombosed fleshy external hemorrhoid.     Hemocult LOT # [id number]  Musculoskeletal:         Back:       Comments: Area of right sided lower back pain.  No focal spinal tenderness.  She also has some pain across both hips as indicated above.         GENERAL: not distressed  HENT: nares patent, mucous membranes are moist  EYES: no scleral icterus  NECK: no ROM limitations  CV: regular rhythm, regular rate  RESPIRATORY: normal effort  ABDOMEN: soft, no focal tenderness on exam to any quadrant.  : deferred  MUSCULOSKELETAL: no deformity  NEURO: alert, moves all extremities, follows commands  SKIN: warm, dry    LAB RESULTS  Recent Results (from the past 24 hours)   Comprehensive Metabolic Panel    Collection Time: 02/28/25  1:40 PM    Specimen: Blood   Result Value Ref Range    Glucose 120 (H) 65 - 99 mg/dL    BUN 47 (H) 8 - 23 mg/dL    Creatinine 1.61 (H) 0.57 - 1.00 mg/dL    Sodium 139 136 - 145 mmol/L    Potassium 3.5 3.5 - 5.2 mmol/L    Chloride 108 (H) 98 - 107 mmol/L    CO2 21.9 (L) 22.0 - 29.0 mmol/L    Calcium 9.4 8.6 - 10.5 mg/dL    Total Protein 6.9 6.0 - 8.5 g/dL    Albumin 4.1 3.5 - 5.2 g/dL    ALT (SGPT) 15 1 - 33 U/L    AST (SGOT) 18 1 - 32 U/L    Alkaline Phosphatase 213 (H) 39 - 117 U/L    Total Bilirubin 0.3 0.0 - 1.2 mg/dL    Globulin 2.8 gm/dL    A/G Ratio 1.5 g/dL    BUN/Creatinine Ratio 29.2 (H) 7.0 - 25.0    Anion Gap 9.1 5.0 - 15.0 mmol/L    eGFR 31.2 (L) >60.0 mL/min/1.73   Lipase    Collection Time: 02/28/25  1:40 PM    Specimen: Blood   Result Value Ref Range    Lipase 68 (H) 13 - 60 U/L   Magnesium    Collection Time: 02/28/25  1:40 PM    Specimen: Blood   Result Value Ref Range    Magnesium 2.0 1.6 - 2.4 mg/dL   CBC Auto Differential    Collection Time: 02/28/25  1:40 PM    Specimen: Blood   Result Value Ref Range    WBC 6.06 3.40 - 10.80 10*3/mm3    RBC 4.64 3.77 - 5.28 10*6/mm3    Hemoglobin 13.2 12.0 - 15.9 g/dL    Hematocrit 41.0 34.0 - 46.6 %    MCV 88.4 79.0 - 97.0 fL    MCH 28.4 26.6 - 33.0 pg    MCHC 32.2 31.5 - 35.7 g/dL    RDW 12.9 12.3 - 15.4 %    RDW-SD 42.8 37.0 - 54.0  fl    MPV 9.4 6.0 - 12.0 fL    Platelets 200 140 - 450 10*3/mm3    Neutrophil % 63.5 42.7 - 76.0 %    Lymphocyte % 24.9 19.6 - 45.3 %    Monocyte % 6.3 5.0 - 12.0 %    Eosinophil % 4.8 0.3 - 6.2 %    Basophil % 0.3 0.0 - 1.5 %    Immature Grans % 0.2 0.0 - 0.5 %    Neutrophils, Absolute 3.85 1.70 - 7.00 10*3/mm3    Lymphocytes, Absolute 1.51 0.70 - 3.10 10*3/mm3    Monocytes, Absolute 0.38 0.10 - 0.90 10*3/mm3    Eosinophils, Absolute 0.29 0.00 - 0.40 10*3/mm3    Basophils, Absolute 0.02 0.00 - 0.20 10*3/mm3    Immature Grans, Absolute 0.01 0.00 - 0.05 10*3/mm3   Urinalysis With Culture If Indicated - Urine, Clean Catch    Collection Time: 02/28/25  2:43 PM    Specimen: Urine, Clean Catch   Result Value Ref Range    Color, UA Yellow Yellow, Straw    Appearance, UA Cloudy (A) Clear    pH, UA 5.5 5.0 - 8.0    Specific Gravity, UA 1.015 1.005 - 1.030    Glucose, UA Negative Negative    Ketones, UA Negative Negative    Bilirubin, UA Negative Negative    Blood, UA Trace (A) Negative    Protein, UA 30 mg/dL (1+) (A) Negative    Leuk Esterase, UA Moderate (2+) (A) Negative    Nitrite, UA Positive (A) Negative    Urobilinogen, UA 0.2 E.U./dL 0.2 - 1.0 E.U./dL       Ordered the above labs and independently interpreted results.  My findings will be discussed in the ED course or medical decision making section below    RADIOLOGY RESULTS  CT Thoracic Spine Without Contrast    Result Date: 2/28/2025  CT OF THE THORACIC SPINE WITHOUT CONTRAST AND CT OF THE LUMBAR SPINE WITHOUT CONTRAST 02/28/2025  HISTORY: Thoracic and lumbar pain. Patient fell approximately 5 weeks ago.  Axial images were obtained through the thoracic spine and through the lumbar spine. Sagittal and coronal reconstruction images were obtained through the thoracic and lumbar spine.  There is a tiny linear lucency beneath the anterior aspect of the superior endplate of T3 with minimal loss of height of the T3 vertebra. This could be a small acute to subacute  fracture. There is minimal loss of height of T4 which does not appear acute. There is multilevel thoracic osteophytosis. Small disc osteophyte complex is seen at T1-T2.  In the lumbar spine, there is very minimal retrolisthesis of L1 on L2 with vacuum phenomenon and mild disc bulge. Minimal anterolisthesis of L2 on L3 is seen with vacuum phenomenon and broad-based disc bulge with facet joint arthropathy.  There is mild anterolisthesis of L3 on L4 with vacuum phenomenon and mild disc bulge. Facet joint arthropathy at this level.  At L4-L5, there is mild broad-based disc bulge with disc osteophyte complex and facet joint arthropathy.  Mild disc osteophyte complex is seen at L5-S1 with facet joint arthropathy. There is mild-to-moderate multilevel lumbar canal stenosis.  No lumbar spine fractures are seen.      1. Linear lucency beneath the anterior aspect of the superior endplate of T3, which could represent small acute to subacute fracture and there is minimal loss of the T3 vertebral body height. 2. Minimal loss of height of T4, which does not appear acute. 3. Thoracolumbar degenerative disease discussed in more detail above. 4. No lumbar fractures are seen.    Radiation dose reduction techniques were utilized, including automated exposure control and exposure modulation based on body size.        CT Abdomen Pelvis Without Contrast    Result Date: 2/28/2025  CT ABDOMEN PELVIS WO CONTRAST-  INDICATION: Generalized abdominal pain. Nausea vomiting and diarrhea.  COMPARISON: None  TECHNIQUE: Routine CT abdomen/pelvis without IV contrast. Coronal and sagittal reformats. Radiation dose reduction techniques were utilized, including automated exposure control and exposure modulation based on body size.  FINDINGS:  Lung bases: Calcified pulmonary nodules seen in the medial basilar right lower lobe and calcified right hilar lymph nodes, consistent with prior granulomatous infection. Small amount of subsegmental atelectasis at  the bases. Coronary artery atherosclerotic calcifications. Aortic valve calcification.  ABDOMEN: Calcifications in the liver and spleen, consistent with prior granulomatous infection. Few low attenuating lesion seen in the left hepatic lobe, measuring less than 1 cm too small to characterize, often biliary cystic hamartomas. Cholecystectomy. No biliary ductal dilatation. Spleen is normal in size. Moderate pancreatic atrophy. No pancreatic ductal dilatation or mass seen. No adrenal nodules. Nonobstructing nephrolithiasis in the right mid kidney, series 3, axial mage 58, measures 5 mm. No hydronephrosis. No ureterolithiasis.  Pelvis: Underdistended bladder. No bladder calculus. Anteverted uterus. Right ovarian cyst, series 3, axial mage 115, measures 2.1 cm.  Bowel: Small hiatal hernia. No small bowel obstruction. Colonic diverticulosis. Normal appendix.  Abdominal wall: Small fat-containing umbilical hernia. Periumbilical abdominal wall scarring.  Retroperitoneum: No lymphadenopathy.  Vasculature: Moderate aortoiliac atherosclerotic calcification. No abdominal aortic aneurysm.  Osseous structures: Bilateral sacral ala fractures, extends across the S2 segment of the sacrum, with sclerosis.       1. No acute findings identified in the abdomen or pelvis. 2. Colonic diverticulosis. 3. Small hiatal hernia. 4. Nonobstructing right nephrolithiasis. 5. Healing H-shaped sacral insufficiency fracture.  This report was finalized on 2/28/2025 2:59 PM by Dr. Ramon Hillman M.D on Workstation: [id number]        Ordered the above noted radiological studies.  Independently interpreted by me.  My findings will be discussed in the medical decision section below.     PROGRESS, DATA ANALYSIS, CONSULTS AND MEDICAL DECISION MAKING    Please note that this section constitutes my independent interpretation of clinical data including lab results, radiology, EKG's.  This constitutes my independent professional opinion regarding  differential diagnosis and management of this patient.  It may include any factors such as history from outside sources, review of external records, social determinants of health, management of medications, response to those treatments, and discussions with other providers.    ED Course as of 02/28/25 1622   Fri Feb 28, 2025   1435 BUN(!): 47  Despite negative hemocult, with this value I do suspect GI bleed.  [EW]   1435 Creatinine(!): 1.61  Last creatinine 2 years ago: 1.05 [EW]   1440 Added on NS bolus 500 cc, zofran and pantoprazole. CT's have been done and are pending.  [EW]      ED Course User Index  [EW] Nathalie Gold APRN     Orders placed during this visit:  Orders Placed This Encounter   Procedures    CT Thoracic Spine Without Contrast    CT Lumbar Spine Without Contrast    CT Abdomen Pelvis Without Contrast    Comprehensive Metabolic Panel    Lipase    Magnesium    CBC Auto Differential    Urinalysis With Culture If Indicated - Urine, Clean Catch    Urinalysis, Microscopic Only - Urine, Clean Catch    [place] Urine Culture Tube -    Blood Draw With IV Start    Insert peripheral IV    CBC & Differential    ED Acknowledgement Form Needed;            Medical Decision Making  Problems Addressed:  Acute on chronic back pain: complicated acute illness or injury  Acute UTI: complicated acute illness or injury  CLAUDINE (acute kidney injury): complicated acute illness or injury  Closed wedge compression fracture of T3 vertebra, initial encounter: complicated acute illness or injury  Gastrointestinal hemorrhage, unspecified gastrointestinal hemorrhage type: complicated acute illness or injury    Amount and/or Complexity of Data Reviewed  Labs: ordered. Decision-making details documented in ED Course.  Radiology: ordered.    Risk  Prescription drug management.    DDX: viral gastroenteritis, dehydration, CLAUDINE, GI bleed from recent Diclofenac, hiatal hernia, diverticulitis, UTI, acute on chronic t/l spine pain,  compression fracture. She had a fall in January and was seen at hospital but did not have her thoracic or lumbar spine imaged.  Given her age she is high risk for compression fractures.  Labs as documented.  With BUN 47, despite negative hemocult I do suspect the Diclofenac has caused some GI bleeding but she is not tachycardic, hypoxic, and has stable hemoglobin.  Will treat this with PPI and GI referral.  She does have UTI, Rocephin given here and d/c home on cefdinir.  Creatinine 1.61, no recent for comparison.  I did give her some IV  hydration and daughter and I have stressed she needs to drink more water vs coca cola and coffee.  Her abdominal exam is without any tenderness, guarding, rigidity.  Pt to follow up with PCP for recheck of labs in one week.  I did sent short course of opiate to the pharmacy for the back pain. Raul Query completed.     DIAGNOSIS  Final diagnoses:   Gastrointestinal hemorrhage, unspecified gastrointestinal hemorrhage type   Acute UTI   Closed wedge compression fracture of T3 vertebra, initial encounter   Acute on chronic back pain   CLAUDINE (acute kidney injury)          Medication List        New Prescriptions      cefdinir 300 MG capsule  Commonly known as: OMNICEF  Take 1 capsule by mouth Daily for 7 days.     omeprazole 40 MG capsule  Commonly known as: priLOSEC  Take 1 capsule by mouth Daily.     ondansetron ODT 4 MG disintegrating tablet  Commonly known as: ZOFRAN-ODT  Place 1 tablet on the tongue Every 8 (Eight) Hours As Needed for Nausea or Vomiting.            Changed      * HYDROcodone-acetaminophen 5-325 MG per tablet  Commonly known as: NORCO  Take 1 tablet by mouth Every 6 (Six) Hours As Needed for Moderate Pain .  What changed: Another medication with the same name was added. Make sure you understand how and when to take each.     * HYDROcodone-acetaminophen 5-325 MG per tablet  Commonly known as: NORCO  Take 1 tablet by mouth Every 6 (Six) Hours As Needed for Moderate  Pain.  What changed: You were already taking a medication with the same name, and this prescription was added. Make sure you understand how and when to take each.           * This list has 2 medication(s) that are the same as other medications prescribed for you. Read the directions carefully, and ask your doctor or other care provider to review them with you.                   Where to Get Your Medications        These medications were sent to [address] [place] - [address] [facility] - [phone]  - [phone] FX  [address] [facility] [address]      Phone: [phone]   cefdinir 300 MG capsule  HYDROcodone-acetaminophen 5-325 MG per tablet  omeprazole 40 MG capsule  ondansetron ODT 4 MG disintegrating tablet         FOLLOW-UP  Ruchi Moore ([place]), APRN  [address]  [address]  [facility] [address] [phone]    Schedule an appointment as soon as possible for a visit   to be seen next Thursday or Friday        Latest Documented Vital Signs:  As of 16:22 EST  BP- 130/64 HR- 67 Temp- 98.1 °F (36.7 °C) (Oral) O2 sat- 96%    Appropriate PPE utilized throughout this patient encounter to include mask, if indicated, per current protocol. Hand hygiene was performed before donning PPE and after removal when leaving the room.    Please note that portions of this were completed with a voice recognition program.     Note Disclaimer: At [facility], we believe that sharing information builds trust and better relationships. You are receiving this note because you are receiving care at [facility] or recently visited. It is possible you will see health information before a provider has talked with you about it. This kind of information can be easy to misunderstand. To help you fully understand what it means for your health, we urge you to discuss this note with your provider.

## 2025-03-03 LAB — BACTERIA SPEC AEROBE CULT: ABNORMAL
